# Patient Record
Sex: MALE | HISPANIC OR LATINO | Employment: FULL TIME | URBAN - METROPOLITAN AREA
[De-identification: names, ages, dates, MRNs, and addresses within clinical notes are randomized per-mention and may not be internally consistent; named-entity substitution may affect disease eponyms.]

---

## 2022-12-14 ENCOUNTER — HOSPITAL ENCOUNTER (EMERGENCY)
Facility: CLINIC | Age: 32
Discharge: HOME | End: 2022-12-14
Attending: EMERGENCY MEDICINE | Admitting: EMERGENCY MEDICINE

## 2022-12-14 VITALS
DIASTOLIC BLOOD PRESSURE: 86 MMHG | TEMPERATURE: 98.2 F | RESPIRATION RATE: 18 BRPM | SYSTOLIC BLOOD PRESSURE: 139 MMHG | OXYGEN SATURATION: 100 % | HEART RATE: 87 BPM

## 2022-12-14 DIAGNOSIS — F15.10 METHAMPHETAMINE ABUSE (H): ICD-10-CM

## 2022-12-14 DIAGNOSIS — R00.2 PALPITATIONS: ICD-10-CM

## 2022-12-14 LAB
ANION GAP BLD CALCULATED.3IONS-SCNC: 13 MMOL/L (ref 3–14)
ATRIAL RATE - MUSE: 71 BPM
BUN SERPL-MCNC: 11 MG/DL (ref 7–30)
CA-I BLD-MCNC: 4.7 MG/DL (ref 4.4–5.2)
CHLORIDE BLD-SCNC: 104 MMOL/L (ref 94–109)
CO2 BLD-SCNC: 28 MMOL/L (ref 20–32)
CREAT BLD-MCNC: 0.8 MG/DL (ref 0.7–1.3)
DIASTOLIC BLOOD PRESSURE - MUSE: NORMAL MMHG
GLUCOSE BLD-MCNC: 87 MG/DL (ref 70–99)
HCT VFR BLD CALC: 44 % (ref 40–53)
HGB BLD-MCNC: 15 G/DL (ref 13.3–17.7)
INTERPRETATION ECG - MUSE: NORMAL
P AXIS - MUSE: 71 DEGREES
POTASSIUM BLD-SCNC: 3.6 MMOL/L (ref 3.4–5.3)
PR INTERVAL - MUSE: 140 MS
QRS DURATION - MUSE: 104 MS
QT - MUSE: 384 MS
QTC - MUSE: 417 MS
R AXIS - MUSE: 57 DEGREES
SODIUM BLD-SCNC: 140 MMOL/L (ref 133–144)
SYSTOLIC BLOOD PRESSURE - MUSE: NORMAL MMHG
T AXIS - MUSE: 43 DEGREES
VENTRICULAR RATE- MUSE: 71 BPM

## 2022-12-14 PROCEDURE — 80047 BASIC METABLC PNL IONIZED CA: CPT

## 2022-12-14 PROCEDURE — 93005 ELECTROCARDIOGRAM TRACING: CPT

## 2022-12-14 PROCEDURE — 99284 EMERGENCY DEPT VISIT MOD MDM: CPT

## 2022-12-14 RX ORDER — HYDROXYZINE HYDROCHLORIDE 25 MG/1
25 TABLET, FILM COATED ORAL 3 TIMES DAILY PRN
Qty: 15 TABLET | Refills: 0 | Status: SHIPPED | OUTPATIENT
Start: 2022-12-14

## 2022-12-14 ASSESSMENT — ENCOUNTER SYMPTOMS
SHORTNESS OF BREATH: 0
PALPITATIONS: 1
DIARRHEA: 0
SLEEP DISTURBANCE: 1
NAUSEA: 0
DIZZINESS: 1

## 2022-12-14 NOTE — ED TRIAGE NOTES
"Pt reports that he is here due to \"using meth Sunday night that a friend gave me\" pt reports that he was drinking beer at this time as well. PT reports that he doesn't do drugs and since then has been having intermittent palpitations and dizziness. PT VSS and ABC's intact. PT reports that he has been feeling more down and depressed but no thoughts of suicide.       "

## 2022-12-14 NOTE — ED PROVIDER NOTES
History   Chief Complaint:  Drug / Alcohol Assessment and Depression       The history is provided by the patient.      Otoniel Morrison is a 32 year old male who presents with drug & alcohol assessment. The patient reports that 3 days ago he used methamphetamine and drank some beer as well. He states that 2 days ago he went to work and experienced palpitations, dizziness, and hand coldness. He states that he did not sleep at all 2 days ago. He states that last week he consumed 5 pints of beer. He states that this morning he was still experiencing the above symptoms but notes that they have resolved since his arrival. He denies chest pain, shortness of breath, nausea, or diarrhea. He also denies daily medication use, any allergies to medications, or any history of alcohol withdrawal.     Review of Systems   Respiratory: Negative for shortness of breath.    Cardiovascular: Positive for palpitations. Negative for chest pain.   Gastrointestinal: Negative for diarrhea and nausea.   Neurological: Positive for dizziness.   Psychiatric/Behavioral: Positive for sleep disturbance.   All other systems reviewed and are negative.    Allergies:  No known drug allergies    Medications:  The patient is not currently taking any prescribed medications.    Past Medical History:     The patient does not have any past pertinent medical history.     Social History:  The patient presents to the ED alone. He arrived via private vehicle.    Physical Exam     Patient Vitals for the past 24 hrs:   BP Temp Temp src Pulse Resp SpO2   12/14/22 1328 139/86 98.2  F (36.8  C) Temporal 87 18 100 %       Physical Exam      HEENT:    Oropharynx is moist  Eyes:    Conjunctiva normal  Neck:    Supple, no meningismus.     CV:     Regular rate and rhythm.      No murmurs, rubs or gallops.    PULM:    Clear to auscultation bilateral.       No respiratory distress.      Good air exchange.  ABD:    Soft, non-tender, non-distended.       No rebound,  guarding or rigidity.  MSK:     No gross deformity to all four extremities.   LYMPH:   No cervical lymphadenopathy.  NEURO:   Alert and oriented x 3.      Speech is clear with no aphasia.     Gait stable.       Normal muscular tone, no tremor.  Skin:    Warm, dry and intact.    Psych:    Mildly anxious     Calm, cooperative     Memory intact.      Emergency Department Course   ECG  ECG taken at 1334, ECG read at 1545  Normal sinus rhythm.   Rate 71 bpm. WV interval 140 ms. QRS duration 104 ms. QT/QTc 384/417 ms. P-R-T axes 71 57 43.     Laboratory:  Labs Ordered and Resulted from Time of ED Arrival to Time of ED Departure   ISTAT BASIC CHEM ICA HEMATOCRIT POCT - Normal       Result Value    Chloride POCT 104      Potassium POCT 3.6      Sodium POCT 140      UREA NITROGEN POCT 11      Calcium, Ionized Whole Blood POCT 4.7      Glucose Whole Blood POCT 87      Anion Gap POCT 13.0      Hemoglobin POCT 15.0      Hematocrit POCT 44      Creatinine POCT 0.8      TOTAL CO2 POCT 28        Emergency Department Course:       Reviewed:  I reviewed nursing notes, vitals, past medical history and Care Everywhere    Assessments:  1538 I obtained history and examined the patient as noted above.     Disposition:  The patient was discharged to home.     Impression & Plan     Medical Decision Makin-year-old male presents to the ED after recent methamphetamine and alcohol abuse.  He is concerned because he has had insomnia, palpitations and dizziness.  Much of his symptoms have completely resolved.  He has no ongoing signs of intoxication.  Basic laboratory studies are unremarkable.  He was counseled against methamphetamine and excessive alcohol use.  Limited supply of hydroxyzine provided for associated anxiety.  Return to ED for worsening symptoms.    Diagnosis:    ICD-10-CM    1. Methamphetamine abuse (H)  F15.10       2. Palpitations  R00.2           Discharge Medications:  Discharge Medication List as of 2022  4:18  PM      START taking these medications    Details   hydrOXYzine (ATARAX) 25 MG tablet Take 1 tablet (25 mg) by mouth 3 times daily as needed for itching or anxiety, Disp-15 tablet, R-0, Local Print             Scribe Disclosure:  I, Marie Thao, am serving as a scribe at 3:38 PM on 12/14/2022 to document services personally performed by Adiel Rincon MD based on my observations and the provider's statements to me.        Adiel Rincon MD  12/14/22 1825